# Patient Record
Sex: FEMALE | Race: WHITE | NOT HISPANIC OR LATINO | URBAN - METROPOLITAN AREA
[De-identification: names, ages, dates, MRNs, and addresses within clinical notes are randomized per-mention and may not be internally consistent; named-entity substitution may affect disease eponyms.]

---

## 2023-09-20 ENCOUNTER — LAB OUTSIDE AN ENCOUNTER (OUTPATIENT)
Dept: URBAN - METROPOLITAN AREA CLINIC 88 | Facility: CLINIC | Age: 48
End: 2023-09-20

## 2023-09-20 ENCOUNTER — WEB ENCOUNTER (OUTPATIENT)
Dept: URBAN - METROPOLITAN AREA CLINIC 88 | Facility: CLINIC | Age: 48
End: 2023-09-20

## 2023-09-20 ENCOUNTER — OFFICE VISIT (OUTPATIENT)
Dept: URBAN - METROPOLITAN AREA CLINIC 88 | Facility: CLINIC | Age: 48
End: 2023-09-20
Payer: COMMERCIAL

## 2023-09-20 ENCOUNTER — DASHBOARD ENCOUNTERS (OUTPATIENT)
Age: 48
End: 2023-09-20

## 2023-09-20 VITALS
OXYGEN SATURATION: 98 % | HEART RATE: 79 BPM | BODY MASS INDEX: 27.48 KG/M2 | TEMPERATURE: 97.9 F | DIASTOLIC BLOOD PRESSURE: 65 MMHG | HEIGHT: 60 IN | WEIGHT: 140 LBS | SYSTOLIC BLOOD PRESSURE: 100 MMHG

## 2023-09-20 DIAGNOSIS — R10.13 EPIGASTRIC ABDOMINAL PAIN: ICD-10-CM

## 2023-09-20 DIAGNOSIS — Z12.11 ENCOUNTER FOR SCREENING COLONOSCOPY: ICD-10-CM

## 2023-09-20 DIAGNOSIS — R14.0 BLOATING: ICD-10-CM

## 2023-09-20 DIAGNOSIS — R11.0 NAUSEA: ICD-10-CM

## 2023-09-20 DIAGNOSIS — R10.11 RIGHT UPPER QUADRANT PAIN: ICD-10-CM

## 2023-09-20 DIAGNOSIS — K21.9 GASTROESOPHAGEAL REFLUX DISEASE, UNSPECIFIED WHETHER ESOPHAGITIS PRESENT: ICD-10-CM

## 2023-09-20 DIAGNOSIS — K58.1 IRRITABLE BOWEL SYNDROME WITH CONSTIPATION: ICD-10-CM

## 2023-09-20 PROBLEM — 235595009: Status: ACTIVE | Noted: 2023-09-20

## 2023-09-20 PROBLEM — 440630006: Status: ACTIVE | Noted: 2023-09-20

## 2023-09-20 PROCEDURE — 99204 OFFICE O/P NEW MOD 45 MIN: CPT | Performed by: NURSE PRACTITIONER

## 2023-09-20 RX ORDER — NORETHINDRONE 0.35 MG/1
TABLET ORAL
Qty: 84 TABLET | Status: ACTIVE | COMMUNITY

## 2023-09-20 RX ORDER — CLONAZEPAM 0.5 MG/1
1 TABLET TABLET ORAL THREE TIMES A DAY
Status: ACTIVE | COMMUNITY

## 2023-09-20 RX ORDER — BUSPIRONE HYDROCHLORIDE 10 MG/1
1 TABLET TABLET ORAL THREE TIMES A DAY
Status: ACTIVE | COMMUNITY

## 2023-09-20 RX ORDER — AZELASTINE HYDROCHLORIDE 137 UG/1
SPRAY 2 SPRAYS (274 MCG) IN EACH NOSTRIL BY INTRANASAL ROUTE 2 TIMES PER DAY FOR 30 DAYS SPRAY, METERED NASAL
Qty: 30 MILLILITER | Refills: 5 | Status: ACTIVE | COMMUNITY

## 2023-09-20 RX ORDER — PANTOPRAZOLE SODIUM 40 MG/1
1 TABLET TABLET, DELAYED RELEASE ORAL
Qty: 90 | Refills: 1 | OUTPATIENT
Start: 2023-09-20

## 2023-09-20 NOTE — HPI-TODAY'S VISIT:
Patient presents today for evaluation of epigastric abdominal pain that has been occuring intermittent for a year.  Feels her pain is aggravated by taking ibuprofen or acetaminophen as needed.  Denies excessive use of ibuprofen due to underlying polycystic kidney disease.  Describes her pain as intermittent "intense, dull aching" sensation that can last for hours or 24 hours at a time.  Has noticed that excessive consumption of greasy foods can aggravate her symptoms.  Defecation or belching helps to alleviate her complaints.  Has also tried TUMS as needed with mild relief.  Last EGD was over 10 years ago.    Voices a long history of constipation.  Typically defecation occurs 2-3 times a week.  Has tried incorporating dietary and lifestyle cahnges to manage this.  Prior to this change, defecation occured once every 7-14 days.   Constipation can lead to increase nausea, bloating and nausea.  Has tried and failed OTC laxatives, Smooth Move tea, enemas, Miralax and fiber supplements.  Previous gastroenterologist provided samples of Linzess but felt medication was not effective.  Last colonosocpy was over 10 years ago with normal findings voiced.  Over the last 2 years admits to increase levels of stress regarding issues affecting her son. Also states currently under the care of Pierre Weller at [facility] for work-up regarding MVR.

## 2023-09-20 NOTE — PHYSICAL EXAM GASTROINTESTINAL
Abdomen , soft, RUQ, epigastric tenderness, nondistended , no guarding or rigidity , no masses palpable , normal bowel sounds , Liver and Spleen: no hepatosplenomegaly

## 2023-09-28 LAB
A/G RATIO: 1.7
ABSOLUTE BASOPHILS: 54
ABSOLUTE EOSINOPHILS: 126
ABSOLUTE LYMPHOCYTES: 2502
ABSOLUTE MONOCYTES: 423
ABSOLUTE NEUTROPHILS: 5895
ALBUMIN: 4.3
ALKALINE PHOSPHATASE: 56
ALT (SGPT): 17
AST (SGOT): 13
BASOPHILS: 0.6
BILIRUBIN, TOTAL: 2
BUN/CREATININE RATIO: (no result)
BUN: 13
CALCIUM: 9.7
CARBON DIOXIDE, TOTAL: 23
CHLORIDE: 105
CREATININE: 0.68
EGFR: 109
EOSINOPHILS: 1.4
GLOBULIN, TOTAL: 2.5
GLUCOSE: 82
HEMATOCRIT: 40.3
HEMOGLOBIN: 13.6
LIPASE: 36
LYMPHOCYTES: 27.8
MCH: 31.2
MCHC: 33.7
MCV: 92.4
MONOCYTES: 4.7
MPV: 10.5
NEUTROPHILS: 65.5
PLATELET COUNT: 301
POTASSIUM: 4
PROTEIN, TOTAL: 6.8
RDW: 11.8
RED BLOOD CELL COUNT: 4.36
SODIUM: 139
WHITE BLOOD CELL COUNT: 9

## 2025-01-07 ENCOUNTER — LAB OUTSIDE AN ENCOUNTER (OUTPATIENT)
Dept: URBAN - METROPOLITAN AREA CLINIC 88 | Facility: CLINIC | Age: 50
End: 2025-01-07

## 2025-01-07 ENCOUNTER — OFFICE VISIT (OUTPATIENT)
Dept: URBAN - METROPOLITAN AREA CLINIC 88 | Facility: CLINIC | Age: 50
End: 2025-01-07
Payer: COMMERCIAL

## 2025-01-07 VITALS
SYSTOLIC BLOOD PRESSURE: 110 MMHG | BODY MASS INDEX: 28.07 KG/M2 | DIASTOLIC BLOOD PRESSURE: 77 MMHG | WEIGHT: 143 LBS | HEIGHT: 60 IN | OXYGEN SATURATION: 99 % | HEART RATE: 97 BPM | TEMPERATURE: 98.1 F

## 2025-01-07 DIAGNOSIS — R10.10 UPPER ABDOMINAL PAIN: ICD-10-CM

## 2025-01-07 DIAGNOSIS — R11.0 NAUSEA: ICD-10-CM

## 2025-01-07 DIAGNOSIS — K58.1 IRRITABLE BOWEL SYNDROME WITH CONSTIPATION: ICD-10-CM

## 2025-01-07 DIAGNOSIS — Q44.6 POLYCYSTIC LIVER DISEASE: ICD-10-CM

## 2025-01-07 DIAGNOSIS — Z12.11 ENCOUNTER FOR SCREENING COLONOSCOPY: ICD-10-CM

## 2025-01-07 DIAGNOSIS — K30 INDIGESTION: ICD-10-CM

## 2025-01-07 PROBLEM — 162031009: Status: ACTIVE | Noted: 2025-01-07

## 2025-01-07 PROCEDURE — 99214 OFFICE O/P EST MOD 30 MIN: CPT | Performed by: NURSE PRACTITIONER

## 2025-01-07 RX ORDER — BUSPIRONE HYDROCHLORIDE 10 MG/1
1 TABLET TABLET ORAL THREE TIMES A DAY
Status: ACTIVE | COMMUNITY

## 2025-01-07 RX ORDER — AZELASTINE HYDROCHLORIDE 137 UG/1
SPRAY 2 SPRAYS (274 MCG) IN EACH NOSTRIL BY INTRANASAL ROUTE 2 TIMES PER DAY FOR 30 DAYS SPRAY, METERED NASAL
Qty: 30 MILLILITER | Refills: 5 | Status: ACTIVE | COMMUNITY

## 2025-01-07 RX ORDER — PANTOPRAZOLE SODIUM 40 MG/1
1 TABLET TABLET, DELAYED RELEASE ORAL
Qty: 90 | Refills: 1 | Status: DISCONTINUED | COMMUNITY
Start: 2023-09-20

## 2025-01-07 RX ORDER — NORETHINDRONE 0.35 MG/1
TABLET ORAL
Qty: 84 TABLET | Status: ACTIVE | COMMUNITY

## 2025-01-07 RX ORDER — PANTOPRAZOLE SODIUM 40 MG/1
TAKE 1 TABLET TABLET, DELAYED RELEASE ORAL
Qty: 90 | Refills: 1 | OUTPATIENT
Start: 2025-01-07

## 2025-01-07 RX ORDER — CLONAZEPAM 0.5 MG/1
1 TABLET TABLET ORAL TWICE A DAY
Status: ACTIVE | COMMUNITY

## 2025-01-07 NOTE — HPI-TODAY'S VISIT:
49 y.o. presents today to schedule screening colonoscopy. Last colonoscopy was over 10 years ago with normal findings voiced.  She denies family history of colon cancer. Patient has not had signs of rectal bleeding, changes in bowel habits, or diarrhea.  Reports a long hx of constipation. Defecation occurs about 1-2 times a week. Drinks Smooth Move Tea or other digestive herbal teas as needed to manage constipation. Has also tried and failed OTC laxatives, enemas, Miralax and fiber supplements. Reports intermittent sharp pains to rectum with defecation. This lasts for seconds before resolving.  Samples of Linzess was provided by her previous gastroenterologist but felt medication was not effective.  She is unsure how effective samples were.    She is also requesting EGD. Has been experiencing upper abdominal pain (primarily to RUQ/epigastric region) that returned over a week ago.  She voiced similar complaints at [facility] in 2023.   Aggravating factors:  eating in general Associated symptoms: nausea w/o vomiting, early satiety Alleviating factors: TUMS but has noticed increase requirements.  She has a hx of polycystic liver disease.  Last CT A/P w/o contrast in 2023 revealed bilateral nonobstructing renal calculus, multiple liver cysts and surgically absent GB  She denies excessive use of NSAIDs. States last EGD was over 10 years.

## 2025-01-07 NOTE — HPI-OTHER HISTORIES
- - - - - - - - - - - - - - - - - - - - - - - - - - - - - Office note 09/20/2023: Patient presents today for evaluation of epigastric abdominal pain that has been occuring intermittent for a year. Feels her pain is aggravated by taking ibuprofen or acetaminophen as needed. Denies excessive use of ibuprofen due to underlying polycystic kidney disease. Describes her pain as intermittent "intense, dull aching" sensation that can last for hours or 24 hours at a time. Has noticed that excessive consumption of greasy foods can aggravate her symptoms. Defecation or belching helps to alleviate her complaints. Has also tried TUMS as needed with mild relief. Last EGD was over 10 years ago.  Voices a long history of constipation. Typically defecation occurs 2-3 times a week. Has tried incorporating dietary and lifestyle cahnges to manage this. Prior to this change, defecation occured once every 7-14 days. Constipation can lead to increase nausea, bloating and nausea. Has tried and failed OTC laxatives, Smooth Move tea, enemas, Miralax and fiber supplements. Previous gastroenterologist provided samples of Linzess but felt medication was not effective. Last colonosocpy was over 10 years ago with normal findings voiced.  Over the last 2 years admits to increase levels of stress regarding issues affecting her son. Also states currently under the care of Pierre Weller at [facility] for work-up regarding MVR.

## 2025-01-22 ENCOUNTER — OFFICE VISIT (OUTPATIENT)
Dept: URBAN - METROPOLITAN AREA SURGERY CENTER 24 | Facility: SURGERY CENTER | Age: 50
End: 2025-01-22

## 2025-01-29 ENCOUNTER — TELEPHONE ENCOUNTER (OUTPATIENT)
Dept: URBAN - METROPOLITAN AREA CLINIC 70 | Facility: CLINIC | Age: 50
End: 2025-01-29

## 2025-01-29 ENCOUNTER — CLAIMS CREATED FROM THE CLAIM WINDOW (OUTPATIENT)
Dept: URBAN - METROPOLITAN AREA CLINIC 4 | Facility: CLINIC | Age: 50
End: 2025-01-29
Payer: COMMERCIAL

## 2025-01-29 ENCOUNTER — OFFICE VISIT (OUTPATIENT)
Dept: URBAN - METROPOLITAN AREA SURGERY CENTER 24 | Facility: SURGERY CENTER | Age: 50
End: 2025-01-29

## 2025-01-29 DIAGNOSIS — K31.89 OTHER DISEASES OF STOMACH AND DUODENUM: ICD-10-CM

## 2025-01-29 DIAGNOSIS — K22.81 ESOPHAGEAL POLYP: ICD-10-CM

## 2025-01-29 PROCEDURE — 88305 TISSUE EXAM BY PATHOLOGIST: CPT | Performed by: PATHOLOGY

## 2025-01-29 PROCEDURE — 88312 SPECIAL STAINS GROUP 1: CPT | Performed by: PATHOLOGY

## 2025-01-29 RX ORDER — PANTOPRAZOLE SODIUM 40 MG/1
TAKE 1 TABLET TABLET, DELAYED RELEASE ORAL
Qty: 90 TABLET | Refills: 0
Start: 2025-01-07

## 2025-01-29 RX ORDER — PANTOPRAZOLE SODIUM 40 MG/1
TAKE 1 TABLET TABLET, DELAYED RELEASE ORAL
Qty: 90 | Refills: 1 | Status: ACTIVE | COMMUNITY
Start: 2025-01-07

## 2025-01-29 RX ORDER — BUSPIRONE HYDROCHLORIDE 10 MG/1
1 TABLET TABLET ORAL THREE TIMES A DAY
Status: ACTIVE | COMMUNITY

## 2025-01-29 RX ORDER — CLONAZEPAM 0.5 MG/1
1 TABLET TABLET ORAL TWICE A DAY
Status: ACTIVE | COMMUNITY

## 2025-01-29 RX ORDER — AZELASTINE HYDROCHLORIDE 137 UG/1
SPRAY 2 SPRAYS (274 MCG) IN EACH NOSTRIL BY INTRANASAL ROUTE 2 TIMES PER DAY FOR 30 DAYS SPRAY, METERED NASAL
Qty: 30 MILLILITER | Refills: 5 | Status: ACTIVE | COMMUNITY

## 2025-01-29 RX ORDER — NORETHINDRONE 0.35 MG/1
TABLET ORAL
Qty: 84 TABLET | Status: ACTIVE | COMMUNITY

## 2025-01-30 ENCOUNTER — TELEPHONE ENCOUNTER (OUTPATIENT)
Dept: URBAN - METROPOLITAN AREA CLINIC 88 | Facility: CLINIC | Age: 50
End: 2025-01-30

## 2025-02-26 ENCOUNTER — OFFICE VISIT (OUTPATIENT)
Dept: URBAN - METROPOLITAN AREA CLINIC 88 | Facility: CLINIC | Age: 50
End: 2025-02-26
Payer: COMMERCIAL

## 2025-02-26 VITALS
HEIGHT: 60 IN | HEART RATE: 105 BPM | BODY MASS INDEX: 28.51 KG/M2 | WEIGHT: 145.2 LBS | SYSTOLIC BLOOD PRESSURE: 116 MMHG | TEMPERATURE: 98.2 F | OXYGEN SATURATION: 100 % | DIASTOLIC BLOOD PRESSURE: 82 MMHG

## 2025-02-26 DIAGNOSIS — Q44.6 POLYCYSTIC LIVER DISEASE: ICD-10-CM

## 2025-02-26 DIAGNOSIS — K21.9 GASTROESOPHAGEAL REFLUX DISEASE WITHOUT ESOPHAGITIS: ICD-10-CM

## 2025-02-26 DIAGNOSIS — Q61.3 POLYCYSTIC KIDNEY DISEASE: ICD-10-CM

## 2025-02-26 DIAGNOSIS — K58.1 IRRITABLE BOWEL SYNDROME WITH CONSTIPATION: ICD-10-CM

## 2025-02-26 PROBLEM — 266435005: Status: ACTIVE | Noted: 2025-02-26

## 2025-02-26 PROCEDURE — 99214 OFFICE O/P EST MOD 30 MIN: CPT | Performed by: NURSE PRACTITIONER

## 2025-02-26 RX ORDER — LUBIPROSTONE 8 UG/1
1 CAPSULE WITH FOOD CAPSULE, GELATIN COATED ORAL
Qty: 60 | Refills: 1 | OUTPATIENT
Start: 2025-02-26 | End: 2025-04-27

## 2025-02-26 RX ORDER — PANTOPRAZOLE SODIUM 40 MG/1
TAKE 1 TABLET TABLET, DELAYED RELEASE ORAL
Qty: 90 TABLET | Refills: 0 | Status: ACTIVE | COMMUNITY
Start: 2025-01-07

## 2025-02-26 RX ORDER — BUSPIRONE HYDROCHLORIDE 10 MG/1
1 TABLET TABLET ORAL THREE TIMES A DAY
Status: ACTIVE | COMMUNITY

## 2025-02-26 RX ORDER — PANTOPRAZOLE SODIUM 40 MG/1
TAKE 1 TABLET TABLET, DELAYED RELEASE ORAL
OUTPATIENT

## 2025-02-26 RX ORDER — CLONAZEPAM 0.5 MG/1
1 TABLET TABLET ORAL TWICE A DAY
Status: ACTIVE | COMMUNITY

## 2025-02-26 RX ORDER — NORETHINDRONE 0.35 MG/1
TABLET ORAL
Qty: 84 TABLET | Status: ACTIVE | COMMUNITY

## 2025-02-26 RX ORDER — AZELASTINE HYDROCHLORIDE 137 UG/1
SPRAY 2 SPRAYS (274 MCG) IN EACH NOSTRIL BY INTRANASAL ROUTE 2 TIMES PER DAY FOR 30 DAYS SPRAY, METERED NASAL
Qty: 30 MILLILITER | Refills: 5 | Status: ACTIVE | COMMUNITY

## 2025-02-26 NOTE — HPI-OTHER HISTORIES
- - - - - - - - - - - - - - - - - - - - - - - - - - - - - - -- Office note 01/07/2025: 49 y.o. presents today to schedule screening colonoscopy. Last colonoscopy was over 10 years ago with normal findings voiced. She denies family history of colon cancer. Patient has not had signs of rectal bleeding, changes in bowel habits, or diarrhea. Reports a long hx of constipation. Defecation occurs about 1-2 times a week. Drinks Smooth Move Tea or other digestive herbal teas as needed to manage constipation. Has also tried and failed OTC laxatives, enemas, Miralax and fiber supplements. Reports intermittent sharp pains to rectum with defecation. This lasts for seconds before resolving. Samples of Linzess was provided by her previous gastroenterologist but felt medication was not effective. She is unsure how effective samples were.  She is also requesting EGD. Has been experiencing upper abdominal pain (primarily to RUQ/epigastric region) that returned over a week ago. She voiced similar complaints at [facility] in 2023. Aggravating factors: eating in general Associated symptoms: nausea w/o vomiting, early satiety Alleviating factors: TUMS but has noticed increase requirements.  She has a hx of polycystic liver disease. Last CT A/P w/o contrast in 2023 revealed bilateral nonobstructing renal calculus, multiple liver cysts and surgically absent GB  She denies excessive use of NSAIDs. States last EGD was over 10 years.

## 2025-02-26 NOTE — HPI-TODAY'S VISIT:
Presents today for follow up after undergoing EGD and screening colonoscopy by Dr. Cuellar on 01/29/2025. Colonoscopy:  Sigmoid diverticulosis.  Repeat screening colonoscopy recommended in 10 years. EGD performed to evaluate upper abd pain.  Findings:  Esophageal polyp (+ benign squamous papilloma), non-erosive gastritis (neg h. pylori), single subepithelial papule (benign).  States upper abdominal pain improved with samples of Linzess.  Felt Linzess 290 mcg was most effective but experienced lightheadedness.  Defecation occurred immediately after taking medication.  Experienced a BM the following day with Linzess 145 mcg, along with increased abd pain.  Reports a long hx of constipation. Defecation occurs about 1-2 times a week. Drinks Smooth Move Tea or other digestive herbal teas as needed to manage constipation. Has also tried and failed OTC laxatives, enemas, Miralax and fiber supplements. Reports intermittent sharp pains to rectum with defecation. This lasts for seconds before resolving.  She has a hx of polycystic liver disease.  Repeat CT A/P on 01/14/2025:  Polycystic liver and kidney disease w/o signs of ascites.  Imaging also revealed multiple left adnexal cysts measuring up to 2.3 cm in size.  Repeat pelvic ultrasound recommended in 3-6 months.  States GYN has ordered pelvic ultrasound to evaluate left ovary. She is under the care of nephrology and urology regarding polycystic kidney disease.  Denies family hx of polycystic kidney or liver disease.

## 2025-03-13 ENCOUNTER — LAB OUTSIDE AN ENCOUNTER (OUTPATIENT)
Dept: URBAN - METROPOLITAN AREA CLINIC 70 | Facility: CLINIC | Age: 50
End: 2025-03-13

## 2025-03-17 LAB
A/G RATIO: 2
ALBUMIN: 4.7
ALKALINE PHOSPHATASE: 55
ALT (SGPT): 25
AST (SGOT): 20
BILIRUBIN, TOTAL: 1.3
BUN/CREATININE RATIO: (no result)
BUN: 11
CALCIUM: 9.8
CARBON DIOXIDE, TOTAL: 23
CHLORIDE: 105
CREATININE: 0.57
EGFR: 113
GLOBULIN, TOTAL: 2.4
GLUCOSE: 93
HEMATOCRIT: 43.1
HEMOGLOBIN: 14.4
INR: 1
MCH: 30.6
MCHC: 33.4
MCV: 91.7
MPV: 10.5
PLATELET COUNT: 282
POTASSIUM: 3.7
PROTEIN, TOTAL: 7.1
PT: 10.3
RDW: 11.9
RED BLOOD CELL COUNT: 4.7
SODIUM: 139
WHITE BLOOD CELL COUNT: 6.8

## 2025-05-27 ENCOUNTER — LAB OUTSIDE AN ENCOUNTER (OUTPATIENT)
Dept: URBAN - METROPOLITAN AREA CLINIC 88 | Facility: CLINIC | Age: 50
End: 2025-05-27

## 2025-05-27 ENCOUNTER — OFFICE VISIT (OUTPATIENT)
Dept: URBAN - METROPOLITAN AREA CLINIC 88 | Facility: CLINIC | Age: 50
End: 2025-05-27
Payer: COMMERCIAL

## 2025-05-27 DIAGNOSIS — Q44.6 POLYCYSTIC LIVER DISEASE: ICD-10-CM

## 2025-05-27 DIAGNOSIS — K58.1 IRRITABLE BOWEL SYNDROME WITH CONSTIPATION: ICD-10-CM

## 2025-05-27 DIAGNOSIS — K21.9 GASTROESOPHAGEAL REFLUX DISEASE WITHOUT ESOPHAGITIS: ICD-10-CM

## 2025-05-27 DIAGNOSIS — Q61.3 POLYCYSTIC KIDNEY DISEASE: ICD-10-CM

## 2025-05-27 PROCEDURE — 99214 OFFICE O/P EST MOD 30 MIN: CPT | Performed by: NURSE PRACTITIONER

## 2025-05-27 RX ORDER — LINACLOTIDE 290 UG/1
TAKE 1 CAPSULE AT LEAST 30 MINUTES BEFORE A MEAL CAPSULE, GELATIN COATED ORAL
Qty: 90 | Refills: 3 | OUTPATIENT
Start: 2025-05-27 | End: 2026-05-22

## 2025-05-27 RX ORDER — BUSPIRONE HYDROCHLORIDE 10 MG/1
1 TABLET TABLET ORAL THREE TIMES A DAY
Status: ACTIVE | COMMUNITY

## 2025-05-27 RX ORDER — NORETHINDRONE 0.35 MG/1
TABLET ORAL
Qty: 84 TABLET | Status: ACTIVE | COMMUNITY

## 2025-05-27 RX ORDER — PANTOPRAZOLE SODIUM 40 MG/1
TAKE 1 TABLET TABLET, DELAYED RELEASE ORAL
Status: ACTIVE | COMMUNITY

## 2025-05-27 RX ORDER — AZELASTINE HYDROCHLORIDE 137 UG/1
SPRAY 2 SPRAYS (274 MCG) IN EACH NOSTRIL BY INTRANASAL ROUTE 2 TIMES PER DAY FOR 30 DAYS SPRAY, METERED NASAL
Qty: 30 MILLILITER | Refills: 5 | Status: ACTIVE | COMMUNITY

## 2025-05-27 RX ORDER — CLONAZEPAM 0.5 MG/1
1 TABLET TABLET ORAL TWICE A DAY
Status: ACTIVE | COMMUNITY

## 2025-05-27 RX ORDER — PANTOPRAZOLE SODIUM 40 MG/1
TAKE 1 TABLET TABLET, DELAYED RELEASE ORAL
Qty: 90 TABLET | Refills: 1

## 2025-05-27 NOTE — HPI-TODAY'S VISIT:
Patient presents today for follow up of various GI issues.  GERD:  Currently managed with dietary and lifestyle measures.  Takes pantoprazole as needed.  IBS-C:  Felt samples of Trulance 3 mg was not as effective as Linzess 290 mcg.  Requesting prescription for Linzess.   Drinks Smooth Move Tea or other digestive herbal teas as needed to manage constipation. Has also tried and failed OTC laxatives, enemas, Miralax and fiber supplements.   She has a hx of polycystic liver disease. Repeat CT A/P on 01/14/2025: Polycystic liver and kidney disease w/o signs of ascites. Imaging also revealed multiple left adnexal cysts measuring up to 2.3 cm in size. Repeat pelvic ultrasound recommended in 3-6 months. States GYN has ordered pelvic ultrasound to evaluate left ovary. She is under the care of nephrology and urology regarding polycystic kidney disease. Denies family hx of polycystic kidney or liver disease.

## 2025-05-27 NOTE — HPI-OTHER HISTORIES
--------------------------------------------------------- Office note 02/26/2025: Presents today for follow up after undergoing EGD and screening colonoscopy by Dr. Cuellar on 01/29/2025. Colonoscopy: Sigmoid diverticulosis. Repeat screening colonoscopy recommended in 10 years. EGD performed to evaluate upper abd pain. Findings: Esophageal polyp (+ benign squamous papilloma), non-erosive gastritis (neg h. pylori), single subepithelial papule (benign).  States upper abdominal pain improved with samples of Linzess. Felt Linzess 290 mcg was most effective but experienced lightheadedness. Defecation occurred immediately after taking medication. Experienced a BM the following day with Linzess 145 mcg, along with increased abd pain. Reports a long hx of constipation. Defecation occurs about 1-2 times a week. Drinks Smooth Move Tea or other digestive herbal teas as needed to manage constipation. Has also tried and failed OTC laxatives, enemas, Miralax and fiber supplements. Reports intermittent sharp pains to rectum with defecation. This lasts for seconds before resolving.  She has a hx of polycystic liver disease. Repeat CT A/P on 01/14/2025: Polycystic liver and kidney disease w/o signs of ascites. Imaging also revealed multiple left adnexal cysts measuring up to 2.3 cm in size. Repeat pelvic ultrasound recommended in 3-6 months. States GYN has ordered pelvic ultrasound to evaluate left ovary with conservative measures recommended at this time.   She is under the care of nephrology and urology regarding polycystic kidney disease. Denies family hx of polycystic kidney or liver disease.